# Patient Record
Sex: FEMALE | Race: WHITE | NOT HISPANIC OR LATINO | URBAN - METROPOLITAN AREA
[De-identification: names, ages, dates, MRNs, and addresses within clinical notes are randomized per-mention and may not be internally consistent; named-entity substitution may affect disease eponyms.]

---

## 2020-01-16 ENCOUNTER — OUTPATIENT (OUTPATIENT)
Dept: OUTPATIENT SERVICES | Facility: HOSPITAL | Age: 40
LOS: 1 days | End: 2020-01-16
Payer: COMMERCIAL

## 2020-01-16 VITALS
OXYGEN SATURATION: 100 % | SYSTOLIC BLOOD PRESSURE: 112 MMHG | WEIGHT: 124.34 LBS | RESPIRATION RATE: 14 BRPM | TEMPERATURE: 98 F | HEART RATE: 84 BPM | DIASTOLIC BLOOD PRESSURE: 64 MMHG | HEIGHT: 61 IN

## 2020-01-16 DIAGNOSIS — M20.5X2 OTHER DEFORMITIES OF TOE(S) (ACQUIRED), LEFT FOOT: ICD-10-CM

## 2020-01-16 DIAGNOSIS — M20.12 HALLUX VALGUS (ACQUIRED), LEFT FOOT: ICD-10-CM

## 2020-01-16 DIAGNOSIS — M20.42 OTHER HAMMER TOE(S) (ACQUIRED), LEFT FOOT: ICD-10-CM

## 2020-01-16 DIAGNOSIS — Z01.818 ENCOUNTER FOR OTHER PREPROCEDURAL EXAMINATION: ICD-10-CM

## 2020-01-16 DIAGNOSIS — Z98.890 OTHER SPECIFIED POSTPROCEDURAL STATES: Chronic | ICD-10-CM

## 2020-01-16 DIAGNOSIS — M24.575 CONTRACTURE, LEFT FOOT: ICD-10-CM

## 2020-01-16 LAB
HCT VFR BLD CALC: 37.3 % — SIGNIFICANT CHANGE UP (ref 34.5–45)
HGB BLD-MCNC: 12.7 G/DL — SIGNIFICANT CHANGE UP (ref 11.5–15.5)
MCHC RBC-ENTMCNC: 31.8 PG — SIGNIFICANT CHANGE UP (ref 27–34)
MCHC RBC-ENTMCNC: 34 GM/DL — SIGNIFICANT CHANGE UP (ref 32–36)
MCV RBC AUTO: 93.3 FL — SIGNIFICANT CHANGE UP (ref 80–100)
NRBC # BLD: 0 /100 WBCS — SIGNIFICANT CHANGE UP (ref 0–0)
PLATELET # BLD AUTO: 273 K/UL — SIGNIFICANT CHANGE UP (ref 150–400)
RBC # BLD: 4 M/UL — SIGNIFICANT CHANGE UP (ref 3.8–5.2)
RBC # FLD: 12 % — SIGNIFICANT CHANGE UP (ref 10.3–14.5)
WBC # BLD: 6.82 K/UL — SIGNIFICANT CHANGE UP (ref 3.8–10.5)
WBC # FLD AUTO: 6.82 K/UL — SIGNIFICANT CHANGE UP (ref 3.8–10.5)

## 2020-01-16 PROCEDURE — G0463: CPT

## 2020-01-16 PROCEDURE — 36415 COLL VENOUS BLD VENIPUNCTURE: CPT

## 2020-01-16 PROCEDURE — 85027 COMPLETE CBC AUTOMATED: CPT

## 2020-01-16 NOTE — H&P PST ADULT - NEGATIVE MUSCULOSKELETAL SYMPTOMS
no muscle cramps/no arm pain L/no back pain/no muscle weakness/no neck pain/no arthralgia/no arthritis/no joint swelling/no myalgia/no stiffness/no arm pain R/no leg pain L/no leg pain R

## 2020-01-16 NOTE — H&P PST ADULT - NSICDXPROBLEM_GEN_ALL_CORE_FT
PROBLEM DIAGNOSES  Problem: Hallux valgus (acquired), left foot  Assessment and Plan: Pre-op instructions given. Pt verbalized understanding  Pepcid & Chlorhexidine wash instructions given  UCG ordered STAT for day of procedure  Pending: Medical clearance - requested by surgeon

## 2020-01-16 NOTE — H&P PST ADULT - ATTENDING COMMENTS
Physician Assistant Statement 01-24-20  I have personally seen and interviewed the patient. There have NOT been any changes in the patient's history or review of systems since PMD visit.

## 2020-01-16 NOTE — H&P PST ADULT - NSICDXPASTSURGICALHX_GEN_ALL_CORE_FT
PAST SURGICAL HISTORY:  H/O breast surgery breast lift    H/O foot surgery right bunion    No Past Surgical History     S/P eye surgery laser surgery

## 2020-01-16 NOTE — H&P PST ADULT - NSANTHOSAYNRD_GEN_A_CORE
neck 12 inches/No. STACEY screening performed.  STOP BANG Legend: 0-2 = LOW Risk; 3-4 = INTERMEDIATE Risk; 5-8 = HIGH Risk

## 2020-01-23 ENCOUNTER — TRANSCRIPTION ENCOUNTER (OUTPATIENT)
Age: 40
End: 2020-01-23

## 2020-01-24 ENCOUNTER — RESULT REVIEW (OUTPATIENT)
Age: 40
End: 2020-01-24

## 2020-01-24 ENCOUNTER — OUTPATIENT (OUTPATIENT)
Dept: OUTPATIENT SERVICES | Facility: HOSPITAL | Age: 40
LOS: 1 days | End: 2020-01-24
Payer: COMMERCIAL

## 2020-01-24 VITALS
DIASTOLIC BLOOD PRESSURE: 68 MMHG | OXYGEN SATURATION: 98 % | SYSTOLIC BLOOD PRESSURE: 105 MMHG | RESPIRATION RATE: 16 BRPM | HEART RATE: 70 BPM | TEMPERATURE: 98 F

## 2020-01-24 VITALS
RESPIRATION RATE: 14 BRPM | DIASTOLIC BLOOD PRESSURE: 57 MMHG | WEIGHT: 124.34 LBS | HEART RATE: 72 BPM | TEMPERATURE: 98 F | SYSTOLIC BLOOD PRESSURE: 96 MMHG | OXYGEN SATURATION: 100 % | HEIGHT: 61 IN

## 2020-01-24 DIAGNOSIS — Z98.890 OTHER SPECIFIED POSTPROCEDURAL STATES: Chronic | ICD-10-CM

## 2020-01-24 DIAGNOSIS — M24.575 CONTRACTURE, LEFT FOOT: ICD-10-CM

## 2020-01-24 DIAGNOSIS — M20.5X2 OTHER DEFORMITIES OF TOE(S) (ACQUIRED), LEFT FOOT: ICD-10-CM

## 2020-01-24 DIAGNOSIS — M20.42 OTHER HAMMER TOE(S) (ACQUIRED), LEFT FOOT: ICD-10-CM

## 2020-01-24 DIAGNOSIS — M20.12 HALLUX VALGUS (ACQUIRED), LEFT FOOT: ICD-10-CM

## 2020-01-24 PROCEDURE — 88304 TISSUE EXAM BY PATHOLOGIST: CPT

## 2020-01-24 PROCEDURE — 28299 COR HLX VLGS DOUBLE OSTEOT: CPT | Mod: LT

## 2020-01-24 PROCEDURE — 73620 X-RAY EXAM OF FOOT: CPT | Mod: 26,LT

## 2020-01-24 PROCEDURE — 73620 X-RAY EXAM OF FOOT: CPT

## 2020-01-24 PROCEDURE — C1713: CPT

## 2020-01-24 PROCEDURE — 28272 RELEASE OF TOE JOINT EACH: CPT | Mod: T3

## 2020-01-24 PROCEDURE — 28270 RELEASE OF FOOT CONTRACTURE: CPT | Mod: XS,LT

## 2020-01-24 PROCEDURE — 88311 DECALCIFY TISSUE: CPT | Mod: 26

## 2020-01-24 PROCEDURE — 88311 DECALCIFY TISSUE: CPT

## 2020-01-24 PROCEDURE — 88304 TISSUE EXAM BY PATHOLOGIST: CPT | Mod: 26

## 2020-01-24 RX ORDER — HYDROMORPHONE HYDROCHLORIDE 2 MG/ML
0.5 INJECTION INTRAMUSCULAR; INTRAVENOUS; SUBCUTANEOUS ONCE
Refills: 0 | Status: DISCONTINUED | OUTPATIENT
Start: 2020-01-24 | End: 2020-01-24

## 2020-01-24 RX ORDER — SODIUM CHLORIDE 9 MG/ML
1000 INJECTION, SOLUTION INTRAVENOUS
Refills: 0 | Status: DISCONTINUED | OUTPATIENT
Start: 2020-01-24 | End: 2020-01-24

## 2020-01-24 RX ORDER — ONDANSETRON 8 MG/1
4 TABLET, FILM COATED ORAL ONCE
Refills: 0 | Status: DISCONTINUED | OUTPATIENT
Start: 2020-01-24 | End: 2020-01-24

## 2020-01-24 RX ADMIN — SODIUM CHLORIDE 50 MILLILITER(S): 9 INJECTION, SOLUTION INTRAVENOUS at 11:16

## 2020-01-24 NOTE — BRIEF OPERATIVE NOTE - NSICDXBRIEFPOSTOP_GEN_ALL_CORE_FT
POST-OP DIAGNOSIS:  Hammertoe of left foot 24-Jan-2020 13:01:24  Susana Estrada  HV (hallux valgus), left 24-Jan-2020 13:01:16  Susana Estrada

## 2020-01-24 NOTE — BRIEF OPERATIVE NOTE - NSICDXBRIEFPREOP_GEN_ALL_CORE_FT
PRE-OP DIAGNOSIS:  Hammertoe of left foot 24-Jan-2020 13:01:05  Susana Estrada  Hallux valgus, left 24-Jan-2020 13:00:59  Susana Estrada

## 2020-01-24 NOTE — ASU DISCHARGE PLAN (ADULT/PEDIATRIC) - CARE PROVIDER_API CALL
Susana Estrada (DPM)  Surgery  [address] [place] [address]  Phone: [phone]  Fax: [phone]  Scheduled Appointment: 01/28/2020

## 2020-01-24 NOTE — ASU DISCHARGE PLAN (ADULT/PEDIATRIC) - PAIN MANAGEMENT
Prescriptions electronically submitted to pharmacy from doctor's office/Prescription given to patient/guardian

## 2020-01-24 NOTE — BRIEF OPERATIVE NOTE - NSICDXBRIEFPROCEDURE_GEN_ALL_CORE_FT
PROCEDURES:  Flexor tenotomy for hammer toe 24-Jan-2020 13:00:48  Susana Estrada  Dennis-Akin bunionectomy of left foot 24-Jan-2020 13:00:26  Susana Estrada

## 2020-01-31 LAB — SURGICAL PATHOLOGY STUDY: SIGNIFICANT CHANGE UP

## 2020-03-19 NOTE — H&P PST ADULT - HISTORY OF PRESENT ILLNESS
39yo female with medical h/o left foot bunion, presents today for PST for Dennis Dionicio Left Foot, Tenotomy and Capsulotomy scheduled for 1/24/2020 Calm/Appropriate/Playful

## 2021-04-29 NOTE — H&P PST ADULT - ANESTHESIA, PREVIOUS REACTION, PROFILE
UO 15-20/ hr.  Dr. Singh present at bedside, updated.  Continue to monitor.   [facility]

## 2021-08-19 NOTE — ASU DISCHARGE PLAN (ADULT/PEDIATRIC) - OK TO LEAVE MESSAGE ON VOICEMAIL
~I have sent your birth control and antibiotic to your local pharmacy. Please start the birth control on cycle day 5.  ~Stop at the  to schedule your SHG (sonohysterogram) while you are on birth control.  ~You must start your antibiotic the day before your SHG and take a home pregnancy test the morning of the SHG.  ~We will plan on doing the hysteroscopy with a possible Right Salpingectomy (this still needs to be decided by Dr. Syed) with your following cycle.  ~As long as your uterus is \"normal\", we will move forward with IVF.  ~Please lose 13 pounds prior to your egg retrieval.      
Yes

## 2022-01-19 ENCOUNTER — OUTPATIENT (OUTPATIENT)
Dept: OUTPATIENT SERVICES | Facility: HOSPITAL | Age: 42
LOS: 1 days | End: 2022-01-19
Payer: COMMERCIAL

## 2022-01-19 VITALS
HEIGHT: 62 IN | TEMPERATURE: 98 F | OXYGEN SATURATION: 99 % | HEART RATE: 77 BPM | DIASTOLIC BLOOD PRESSURE: 83 MMHG | SYSTOLIC BLOOD PRESSURE: 124 MMHG | RESPIRATION RATE: 18 BRPM | WEIGHT: 123.46 LBS

## 2022-01-19 DIAGNOSIS — Z98.890 OTHER SPECIFIED POSTPROCEDURAL STATES: Chronic | ICD-10-CM

## 2022-01-19 DIAGNOSIS — Z47.2 ENCOUNTER FOR REMOVAL OF INTERNAL FIXATION DEVICE: ICD-10-CM

## 2022-01-19 DIAGNOSIS — M65.871 OTHER SYNOVITIS AND TENOSYNOVITIS, RIGHT ANKLE AND FOOT: ICD-10-CM

## 2022-01-19 LAB
HCT VFR BLD CALC: 37.8 % — SIGNIFICANT CHANGE UP (ref 34.5–45)
HGB BLD-MCNC: 13 G/DL — SIGNIFICANT CHANGE UP (ref 11.5–15.5)
MCHC RBC-ENTMCNC: 32.4 PG — SIGNIFICANT CHANGE UP (ref 27–34)
MCHC RBC-ENTMCNC: 34.4 GM/DL — SIGNIFICANT CHANGE UP (ref 32–36)
MCV RBC AUTO: 94.3 FL — SIGNIFICANT CHANGE UP (ref 80–100)
NRBC # BLD: 0 /100 WBCS — SIGNIFICANT CHANGE UP (ref 0–0)
PLATELET # BLD AUTO: 287 K/UL — SIGNIFICANT CHANGE UP (ref 150–400)
RBC # BLD: 4.01 M/UL — SIGNIFICANT CHANGE UP (ref 3.8–5.2)
RBC # FLD: 12.1 % — SIGNIFICANT CHANGE UP (ref 10.3–14.5)
WBC # BLD: 7.26 K/UL — SIGNIFICANT CHANGE UP (ref 3.8–10.5)
WBC # FLD AUTO: 7.26 K/UL — SIGNIFICANT CHANGE UP (ref 3.8–10.5)

## 2022-01-19 PROCEDURE — 71046 X-RAY EXAM CHEST 2 VIEWS: CPT | Mod: 26

## 2022-01-19 PROCEDURE — 71046 X-RAY EXAM CHEST 2 VIEWS: CPT

## 2022-01-19 PROCEDURE — 36415 COLL VENOUS BLD VENIPUNCTURE: CPT

## 2022-01-19 PROCEDURE — G0463: CPT

## 2022-01-19 PROCEDURE — 85027 COMPLETE CBC AUTOMATED: CPT

## 2022-01-19 RX ORDER — SODIUM CHLORIDE 9 MG/ML
1000 INJECTION, SOLUTION INTRAVENOUS
Refills: 0 | Status: DISCONTINUED | OUTPATIENT
Start: 2022-01-28 | End: 2022-02-11

## 2022-01-19 RX ORDER — IBUPROFEN 200 MG
3 TABLET ORAL
Qty: 0 | Refills: 0 | DISCHARGE

## 2022-01-19 NOTE — H&P PST ADULT - PROBLEM SELECTOR PLAN 1
Scheduled for removal of hardware first metatarsal and right hallux synovectomy right first metatarsal phalangeal joint with Dr Estrada on 01/28/2022.  pre op instructions given and patient verbalized understanding.  CBC and medical clearance pending.  NPO after midnight night before surgery.  To stop all NSAIDs, vitamins and supplements one week prior to procedure.  COVID-19 testing scheduled Chlorhexidine wash given with instructions.  Chest x ray pending.  UCG AM of surgery.

## 2022-01-19 NOTE — H&P PST ADULT - HISTORY OF PRESENT ILLNESS
39 y/o female with no significant PMH presents for PST.  Scheduled for removal of hardware first metatarsal and right hallux, synovectomy right first metatarsal phalangeal joint with Dr Estrada on 01/28/2022.  COVID-19 testing scheduled for 01/25/2022 41 y/o female with no significant PMH of COVID-19 (3/2020- not hospitalized but with symptoms)  presents for PST. C/O palpating right foot hardware which is recommended for surgical removal. Scheduled for removal of hardware first metatarsal and right hallux, synovectomy right first metatarsal phalangeal joint with Dr Estrada on 01/28/2022.  COVID-19 testing scheduled for 01/25/2022

## 2022-01-19 NOTE — H&P PST ADULT - NSICDXPASTSURGICALHX_GEN_ALL_CORE_FT
PAST SURGICAL HISTORY:  H/O breast surgery breast lift 2014    H/O foot surgery right bunion 2012, left bunion 2020    S/P eye surgery LASIK 2013

## 2022-01-27 ENCOUNTER — TRANSCRIPTION ENCOUNTER (OUTPATIENT)
Age: 42
End: 2022-01-27

## 2022-01-28 ENCOUNTER — OUTPATIENT (OUTPATIENT)
Dept: OUTPATIENT SERVICES | Facility: HOSPITAL | Age: 42
LOS: 1 days | End: 2022-01-28
Payer: COMMERCIAL

## 2022-01-28 ENCOUNTER — RESULT REVIEW (OUTPATIENT)
Age: 42
End: 2022-01-28

## 2022-01-28 VITALS
SYSTOLIC BLOOD PRESSURE: 117 MMHG | HEIGHT: 62 IN | OXYGEN SATURATION: 99 % | DIASTOLIC BLOOD PRESSURE: 78 MMHG | HEART RATE: 72 BPM | RESPIRATION RATE: 14 BRPM | WEIGHT: 123.46 LBS | TEMPERATURE: 98 F

## 2022-01-28 VITALS
OXYGEN SATURATION: 98 % | RESPIRATION RATE: 18 BRPM | TEMPERATURE: 98 F | HEART RATE: 60 BPM | DIASTOLIC BLOOD PRESSURE: 64 MMHG | SYSTOLIC BLOOD PRESSURE: 99 MMHG

## 2022-01-28 DIAGNOSIS — Z98.890 OTHER SPECIFIED POSTPROCEDURAL STATES: Chronic | ICD-10-CM

## 2022-01-28 DIAGNOSIS — M65.871 OTHER SYNOVITIS AND TENOSYNOVITIS, RIGHT ANKLE AND FOOT: ICD-10-CM

## 2022-01-28 DIAGNOSIS — Z47.2 ENCOUNTER FOR REMOVAL OF INTERNAL FIXATION DEVICE: ICD-10-CM

## 2022-01-28 PROCEDURE — 73620 X-RAY EXAM OF FOOT: CPT | Mod: 26,RT

## 2022-01-28 PROCEDURE — 88300 SURGICAL PATH GROSS: CPT

## 2022-01-28 PROCEDURE — 28072 REMOVAL OF FOOT JOINT LINING: CPT | Mod: RT

## 2022-01-28 PROCEDURE — 88305 TISSUE EXAM BY PATHOLOGIST: CPT

## 2022-01-28 PROCEDURE — 73620 X-RAY EXAM OF FOOT: CPT

## 2022-01-28 PROCEDURE — 88300 SURGICAL PATH GROSS: CPT | Mod: 26,59

## 2022-01-28 PROCEDURE — 20680 REMOVAL OF IMPLANT DEEP: CPT

## 2022-01-28 PROCEDURE — 88305 TISSUE EXAM BY PATHOLOGIST: CPT | Mod: 26

## 2022-01-28 RX ORDER — L.ACIDOPH/B.ANIMALIS/B.LONGUM 15B CELL
1 CAPSULE ORAL
Qty: 0 | Refills: 0 | DISCHARGE

## 2022-01-28 RX ORDER — HYDROMORPHONE HYDROCHLORIDE 2 MG/ML
0.5 INJECTION INTRAMUSCULAR; INTRAVENOUS; SUBCUTANEOUS
Refills: 0 | Status: DISCONTINUED | OUTPATIENT
Start: 2022-01-28 | End: 2022-01-28

## 2022-01-28 RX ORDER — ONDANSETRON 8 MG/1
4 TABLET, FILM COATED ORAL ONCE
Refills: 0 | Status: DISCONTINUED | OUTPATIENT
Start: 2022-01-28 | End: 2022-01-28

## 2022-01-28 RX ORDER — ASCORBIC ACID 60 MG
1 TABLET,CHEWABLE ORAL
Qty: 0 | Refills: 0 | DISCHARGE

## 2022-01-28 RX ORDER — SODIUM CHLORIDE 9 MG/ML
1000 INJECTION, SOLUTION INTRAVENOUS
Refills: 0 | Status: DISCONTINUED | OUTPATIENT
Start: 2022-01-28 | End: 2022-01-28

## 2022-01-28 RX ORDER — HYDROMORPHONE HYDROCHLORIDE 2 MG/ML
1 INJECTION INTRAMUSCULAR; INTRAVENOUS; SUBCUTANEOUS
Refills: 0 | Status: DISCONTINUED | OUTPATIENT
Start: 2022-01-28 | End: 2022-01-28

## 2022-01-28 RX ORDER — IBUPROFEN 200 MG
4 TABLET ORAL
Qty: 0 | Refills: 0 | DISCHARGE

## 2022-01-28 RX ADMIN — SODIUM CHLORIDE 50 MILLILITER(S): 9 INJECTION, SOLUTION INTRAVENOUS at 12:03

## 2022-01-28 NOTE — ASU DISCHARGE PLAN (ADULT/PEDIATRIC) - CARE PROVIDER_API CALL
Susana Estrada (DPM)  Surgery  [address] [place] [address]  Phone: [phone]  Fax: [phone]  Scheduled Appointment: 02/01/2022

## 2022-01-28 NOTE — ASU PATIENT PROFILE, ADULT - NSICDXPASTMEDICALHX_GEN_ALL_CORE_FT
PAST MEDICAL HISTORY:  COVID-19 3/2020 - with symptoms but no hospitalization     PAST MEDICAL HISTORY:  Anxiety     COVID-19 3/2020 - with symptoms but no hospitalization    History of augmentation of both breasts     Ovarian cyst

## 2022-01-28 NOTE — ASU PATIENT PROFILE, ADULT - ACCEPTABLE
Problem: At Risk for Falls  Goal: # Patient does not fall  Outcome: Outcome Not Met, Continue to Monitor  Goal: # Takes action to control fall-related risks  Outcome: Outcome Not Met, Continue to Monitor  Goal: # Verbalizes understanding of fall risk/precautions  Description: Document education using the patient education activity  Outcome: Outcome Not Met, Continue to Monitor     Problem: At Risk for Injury Due to Fall  Goal: # Patient does not fall  Outcome: Outcome Not Met, Continue to Monitor  Goal: # Takes action to control condition specific risks  Outcome: Outcome Not Met, Continue to Monitor  Goal: # Verbalizes understanding of fall-related injury personal risks  Description: Document education using the patient education activity  Outcome: Outcome Not Met, Continue to Monitor     Problem: Non-Pressure Injury Wound  Goal: # No deterioration in wound  Outcome: Outcome Not Met, Continue to Monitor  Goal: # Verbalizes understanding of wound and wound care  Description: If abnormality is a skin tear, avoid using tape on skin including transparent dressings. Document education using the patient education activity.  Outcome: Outcome Not Met, Continue to Monitor  Goal: Participates in wound care activities  Outcome: Outcome Not Met, Continue to Monitor  Goal: Wound care provided to promote comfort needs (Hospice)  Outcome: Outcome Not Met, Continue to Monitor      7

## 2022-01-28 NOTE — ASU DISCHARGE PLAN (ADULT/PEDIATRIC) - NS MD DC FALL RISK RISK
For information on Fall & Injury Prevention, visit: [email or web address] OR  [email or web address] OR  https://www.cdc.gov/steadi/patient.html

## 2022-01-28 NOTE — ASU PATIENT PROFILE, ADULT - NSICDXPASTSURGICALHX_GEN_ALL_CORE_FT
PAST SURGICAL HISTORY:  H/O breast surgery breast lift 2014    H/O foot surgery right bunion 2012, left bunion 2020    S/P eye surgery LASIK 2013     PAST SURGICAL HISTORY:  H/O breast surgery breast lift 2014    H/O foot surgery right bunion 2012, left bunion 2020    S/P eye surgery LASIK 2013    S/P hemorrhoidectomy

## 2022-01-28 NOTE — ASU PATIENT PROFILE, ADULT - FALL HARM RISK - UNIVERSAL INTERVENTIONS
Bed in lowest position, wheels locked, appropriate side rails in place/Call bell, personal items and telephone in reach/Instruct patient to call for assistance before getting out of bed or chair/Non-slip footwear when patient is out of bed/[place] to call system/Physically safe environment - no spills, clutter or unnecessary equipment/Purposeful Proactive Rounding/Room/bathroom lighting operational, light cord in reach

## 2022-01-28 NOTE — ASU DISCHARGE PLAN (ADULT/PEDIATRIC) - CALL YOUR DOCTOR IF YOU HAVE ANY OF THE FOLLOWING:
Bleeding that does not stop/Pain not relieved by Medications Bleeding that does not stop/Pain not relieved by Medications/Fever greater than (need to indicate Fahrenheit or Celsius)/Wound/Surgical Site with redness, or foul smelling discharge or pus/Numbness, tingling, color or temperature change to extremity

## 2022-02-10 LAB — SURGICAL PATHOLOGY STUDY: SIGNIFICANT CHANGE UP

## 2022-10-13 NOTE — H&P PST ADULT - NEGATIVE GASTROINTESTINAL SYMPTOMS
Quality 431: Preventive Care And Screening: Unhealthy Alcohol Use - Screening: Patient not identified as an unhealthy alcohol user when screened for unhealthy alcohol use using a systematic screening method
Quality 110: Preventive Care And Screening: Influenza Immunization: Influenza immunization was not ordered or administered, reason not given
Quality 226: Preventive Care And Screening: Tobacco Use: Screening And Cessation Intervention: Patient screened for tobacco use and is an ex/non-smoker
Detail Level: Detailed
Quality 130: Documentation Of Current Medications In The Medical Record: Current Medications Documented
no nausea/no vomiting/no diarrhea/no constipation

## 2023-02-27 PROBLEM — U07.1 COVID-19: Chronic | Status: ACTIVE | Noted: 2022-01-19

## 2023-02-27 PROBLEM — N83.209 UNSPECIFIED OVARIAN CYST, UNSPECIFIED SIDE: Chronic | Status: ACTIVE | Noted: 2022-01-28

## 2023-02-27 PROBLEM — Z98.890 OTHER SPECIFIED POSTPROCEDURAL STATES: Chronic | Status: ACTIVE | Noted: 2022-01-28

## 2023-02-27 PROBLEM — F41.9 ANXIETY DISORDER, UNSPECIFIED: Chronic | Status: ACTIVE | Noted: 2022-01-28

## 2023-03-29 ENCOUNTER — APPOINTMENT (OUTPATIENT)
Dept: RHEUMATOLOGY | Facility: CLINIC | Age: 43
End: 2023-03-29
Payer: COMMERCIAL

## 2023-03-29 VITALS
DIASTOLIC BLOOD PRESSURE: 85 MMHG | TEMPERATURE: 98.3 F | SYSTOLIC BLOOD PRESSURE: 135 MMHG | HEART RATE: 92 BPM | WEIGHT: 125 LBS | BODY MASS INDEX: 23 KG/M2 | OXYGEN SATURATION: 98 % | HEIGHT: 62 IN

## 2023-03-29 DIAGNOSIS — L30.9 DERMATITIS, UNSPECIFIED: ICD-10-CM

## 2023-03-29 PROCEDURE — 99203 OFFICE O/P NEW LOW 30 MIN: CPT

## 2023-03-29 NOTE — ASSESSMENT
[FreeTextEntry1] : 42 y/o female referred to rheumatology for refractory eczema x 2 years.\par Pt has been following with dermatology for eczema of b/l hands and wrists and treated with steroid creams. Pt feels that the rash is refractory and creams only help temporarily. Pt's PCP suggested that pt come to rheumatology to evaluate for any autoimmune disease.\par \par Patient has rashes that appear consistent with eczema and following with dermatology. No concern for psoriasis based on appearance and location. Pt does not have any systemic symptoms including inflammatory arthritis concerning for systemic autoimmune disease.\par Advised that eczema may be autoimmune, but of autoimmune disease is organ-limited, such as skin, it is treated by the specialist (dermatology) rather than rheumatology.\par There are systemic therapy for refractory eczema, and if patient feels strongly about systemic therapy, pt should discuss with dermatology about risk and benefits of step up to systemic therapy.\par Patient understood and grateful.\par \par RTC PRN\par \par \par

## 2023-03-29 NOTE — HISTORY OF PRESENT ILLNESS
[FreeTextEntry1] : 44 y/o female referred to rheumatology for refractory eczema x 2 years.\par Pt has been following with dermatology for eczema of b/l hands and wrists and treated with steroid creams. Pt feels that the rash is refractory and creams only help temporarily. Pt's PCP suggested that pt come to rheumatology to evaluate for any autoimmune disease.\par \par Patient denies joint pains, joint swelling/erythema/warmth, nasopharyngeal ulcers, chest pain, abdominal pain, cough, SOB, nausea, vomiting, diarrhea, other rashes, psoriasis, Raynaud's\par ROS negative unless otherwise noted above.\par \par

## 2023-10-04 NOTE — ASU PREOP CHECKLIST - PATIENT PROBLEMS/NEEDS
For dust mite allergy, be sure to wash your bedding, including your sheets, weekly in hot water, in order to reduce dust mites.  Encase your pillow and mattress in a hypoallergenic or anti-dust mite case.      You may consider a HEPA filter if you do not have one, and if possible, put it in the patient's bedroom.      If you have pets, avoid having them in the bedroom if possible.  Regular grooming and wiped the pet with a pet allergy wipe will help with dander and allergens on the pet's fur.  Vacuum regularly.    Keep humidity in the home 30-50% to decrease mold growth.  Clean bathrooms with a bleach solution and fix any leaking faucets.  You may also wipe down windowsills with  bleach solution.    For pollen allergy, keep windows closed and use central air.  You can consider wearing a hat and sunglasses as well to reduce pollen exposure.  After being outside, wash hands and face or shower to reduce the pollen on your body.  Wash clothing after wearing outside during the pollen seasons.     
Patient expressed no known problems or needs

## 2024-11-05 NOTE — ASU DISCHARGE PLAN (ADULT/PEDIATRIC) - PROCEDURE
mack-akin osteotomy, 2nd MTPJ release, tenotomy and capsulotomy digits 2,3,4,5 left foot
Detail Level: Detailed
Quality 47: Advance Care Plan: Advance Care Planning discussed and documented; advance care plan or surrogate decision maker documented in the medical record.
Quality 226: Preventive Care And Screening: Tobacco Use: Screening And Cessation Intervention: Patient screened for tobacco use and is an ex/non-smoker

## (undated) DEVICE — SYR LUER LOK 10CC

## (undated) DEVICE — ARTHREX MULTIFIRE SCORPION NEEDLE

## (undated) DEVICE — BLADE RECIP CROSS CUT SM

## (undated) DEVICE — WRAP COMPRESSION CALF MED

## (undated) DEVICE — CUFF TOURNIQUET 18" DUAL PORT W PLC

## (undated) DEVICE — DRSG WEBRIL 4"

## (undated) DEVICE — GLV 7 PROTEXIS

## (undated) DEVICE — PACK LOWER EXTREMITY

## (undated) DEVICE — SAW BLADE MICROAIRE SAGITTAL 9.4MMX25.4MMX0.6MM

## (undated) DEVICE — DRSG STOCKINETTE IMPERVIOUS XL

## (undated) DEVICE — GLV 7.5 PROTEXIS

## (undated) DEVICE — SHOE  POSTOP SOFTIE DARCO W-LG

## (undated) DEVICE — GLV 6.5 PROTEXIS

## (undated) DEVICE — DRSG ADAPTIC 3X8"

## (undated) DEVICE — BLADE SAFETY LOCK #15

## (undated) DEVICE — DRAPE TOWEL BLUE 17" X 24"

## (undated) DEVICE — SUT POLYSORB 4-0 30" C-13 UNDYED

## (undated) DEVICE — SOL IRR POUR NS 0.9% 500ML

## (undated) DEVICE — NDL HYPO REGULAR BEVEL 25G X 1.5"

## (undated) DEVICE — GLV 6 PROTEXIS

## (undated) DEVICE — STRYKER INTERPULSE HANDPIECE W IRR SUCTION TUBE

## (undated) DEVICE — SHOE  POSTOP SOFTIE DARCO M-M

## (undated) DEVICE — ELCTR EDGE BOVIE INSULATED BLADE TIP 2.75"

## (undated) DEVICE — SUT MONOSOF 5-0 18" C-13

## (undated) DEVICE — PIN DISPENSING SPIKE MINI

## (undated) DEVICE — DRAPE INSTRUMENT POUCH

## (undated) DEVICE — PREP BETADINE KIT

## (undated) DEVICE — ELCTR GROUNDING PAD ADULT COVIDIEN

## (undated) DEVICE — SUT POLYSORB 3-0 18" C-13 UNDYED

## (undated) DEVICE — SOLIDIFIER CANN EXPRESS 3K

## (undated) DEVICE — GLV 7.5 ESTEEM BLUE

## (undated) DEVICE — GOWN TRIMAX XXL

## (undated) DEVICE — BLANKET WARMER UPPER ADULT

## (undated) DEVICE — SHOE  POSTOP SOFTIE DARCO M-SM

## (undated) DEVICE — BLADE FLAT  26 X  6

## (undated) DEVICE — SHOE  POSTOP SOFTIE DARCO M-LG

## (undated) DEVICE — GOWN LG W TOWEL

## (undated) DEVICE — SUT POLYSORB 2-0 30" V-20 UNDYED

## (undated) DEVICE — POSITIONER FOAM HEAD CRADLE

## (undated) DEVICE — SPONGE PVP PAINT SPONGE STICKS

## (undated) DEVICE — DRAPE C ARMOUR

## (undated) DEVICE — DRAPE C ARM UNIVERSAL

## (undated) DEVICE — MARKER SKIN WRITE SITE PLUS

## (undated) DEVICE — PREP CHLORAPREP ORANGE 2PCT 26ML

## (undated) DEVICE — CANISTER SUCTION LID GUARD 3000CC